# Patient Record
Sex: FEMALE | Race: BLACK OR AFRICAN AMERICAN
[De-identification: names, ages, dates, MRNs, and addresses within clinical notes are randomized per-mention and may not be internally consistent; named-entity substitution may affect disease eponyms.]

---

## 2019-10-31 ENCOUNTER — HOSPITAL ENCOUNTER (OUTPATIENT)
Dept: HOSPITAL 61 - KCIC | Age: 59
Discharge: HOME | End: 2019-10-31
Attending: FAMILY MEDICINE
Payer: COMMERCIAL

## 2019-10-31 DIAGNOSIS — R76.11: Primary | ICD-10-CM

## 2019-10-31 PROCEDURE — 71045 X-RAY EXAM CHEST 1 VIEW: CPT

## 2019-10-31 NOTE — [facility]
CHEST AP ONLY

 

History: Positive TB skin test.

 

Comparison: None.

 

Findings:

No consolidation or pleural effusion. Normal heart size. No radiographic 

evidence of primary or latent tuberculosis.

 

Impression: 

1.  No acute cardiopulmonary process.

 

Electronically signed by: Rupert Peoples DO (10/31/2019 4:20 PM) [facility]